# Patient Record
Sex: FEMALE
[De-identification: names, ages, dates, MRNs, and addresses within clinical notes are randomized per-mention and may not be internally consistent; named-entity substitution may affect disease eponyms.]

---

## 2018-07-24 ENCOUNTER — HOSPITAL ENCOUNTER (EMERGENCY)
Dept: HOSPITAL 5 - ED | Age: 63
LOS: 1 days | Discharge: HOME | End: 2018-07-25
Payer: MEDICARE

## 2018-07-24 ENCOUNTER — HOSPITAL ENCOUNTER (EMERGENCY)
Dept: HOSPITAL 5 - ED | Age: 63
End: 2018-07-24
Payer: MEDICARE

## 2018-07-24 VITALS — DIASTOLIC BLOOD PRESSURE: 64 MMHG | SYSTOLIC BLOOD PRESSURE: 129 MMHG

## 2018-07-24 VITALS — SYSTOLIC BLOOD PRESSURE: 147 MMHG | DIASTOLIC BLOOD PRESSURE: 58 MMHG

## 2018-07-24 DIAGNOSIS — R06.00: Primary | ICD-10-CM

## 2018-07-24 DIAGNOSIS — R06.02: ICD-10-CM

## 2018-07-24 DIAGNOSIS — Z88.0: ICD-10-CM

## 2018-07-24 DIAGNOSIS — Z91.013: ICD-10-CM

## 2018-07-24 DIAGNOSIS — R05: ICD-10-CM

## 2018-07-24 DIAGNOSIS — M19.90: ICD-10-CM

## 2018-07-24 DIAGNOSIS — I10: ICD-10-CM

## 2018-07-24 DIAGNOSIS — Z79.82: ICD-10-CM

## 2018-07-24 DIAGNOSIS — R07.89: Primary | ICD-10-CM

## 2018-07-24 DIAGNOSIS — Z90.711: ICD-10-CM

## 2018-07-24 DIAGNOSIS — J44.9: ICD-10-CM

## 2018-07-24 DIAGNOSIS — F17.200: ICD-10-CM

## 2018-07-24 LAB
APTT BLD: 26.2 SEC. (ref 24.2–36.6)
BASOPHILS # (AUTO): 0 K/MM3 (ref 0–0.1)
BASOPHILS NFR BLD AUTO: 0.3 % (ref 0–1.8)
BUN SERPL-MCNC: 15 MG/DL (ref 7–17)
BUN/CREAT SERPL: 14 %
CALCIUM SERPL-MCNC: 9.4 MG/DL (ref 8.4–10.2)
EOSINOPHIL # BLD AUTO: 0.4 K/MM3 (ref 0–0.4)
EOSINOPHIL NFR BLD AUTO: 7.8 % (ref 0–4.3)
HCT VFR BLD CALC: 29.7 % (ref 30.3–42.9)
HEMOLYSIS INDEX: 26
HGB BLD-MCNC: 9.9 GM/DL (ref 10.1–14.3)
INR PPP: 0.87 (ref 0.87–1.13)
LYMPHOCYTES # BLD AUTO: 1.8 K/MM3 (ref 1.2–5.4)
LYMPHOCYTES NFR BLD AUTO: 32.6 % (ref 13.4–35)
MCH RBC QN AUTO: 32 PG (ref 28–32)
MCHC RBC AUTO-ENTMCNC: 33 % (ref 30–34)
MCV RBC AUTO: 97 FL (ref 79–97)
MONOCYTES # (AUTO): 0.5 K/MM3 (ref 0–0.8)
MONOCYTES % (AUTO): 9.7 % (ref 0–7.3)
PLATELET # BLD: 246 K/MM3 (ref 140–440)
RBC # BLD AUTO: 3.06 M/MM3 (ref 3.65–5.03)

## 2018-07-24 PROCEDURE — 71275 CT ANGIOGRAPHY CHEST: CPT

## 2018-07-24 PROCEDURE — 93005 ELECTROCARDIOGRAM TRACING: CPT

## 2018-07-24 PROCEDURE — 94640 AIRWAY INHALATION TREATMENT: CPT

## 2018-07-24 PROCEDURE — 99284 EMERGENCY DEPT VISIT MOD MDM: CPT

## 2018-07-24 PROCEDURE — 85025 COMPLETE CBC W/AUTO DIFF WBC: CPT

## 2018-07-24 PROCEDURE — 85379 FIBRIN DEGRADATION QUANT: CPT

## 2018-07-24 PROCEDURE — 93010 ELECTROCARDIOGRAM REPORT: CPT

## 2018-07-24 PROCEDURE — 36415 COLL VENOUS BLD VENIPUNCTURE: CPT

## 2018-07-24 PROCEDURE — 71045 X-RAY EXAM CHEST 1 VIEW: CPT

## 2018-07-24 PROCEDURE — 85730 THROMBOPLASTIN TIME PARTIAL: CPT

## 2018-07-24 PROCEDURE — 83880 ASSAY OF NATRIURETIC PEPTIDE: CPT

## 2018-07-24 PROCEDURE — 96374 THER/PROPH/DIAG INJ IV PUSH: CPT

## 2018-07-24 PROCEDURE — 80048 BASIC METABOLIC PNL TOTAL CA: CPT

## 2018-07-24 PROCEDURE — 84484 ASSAY OF TROPONIN QUANT: CPT

## 2018-07-24 PROCEDURE — 85610 PROTHROMBIN TIME: CPT

## 2018-07-24 NOTE — CAT SCAN REPORT
FINAL REPORT



EXAM:  CT ANGIO CHEST



HISTORY:  dyspnea + d dimer 



TECHNIQUE:  CT chest CT angiogram with reconstructions 



PRIORS:  None.



FINDINGS:  

There is no evidence of filling defect within the central

pulmonary vasculature to suggest the presence of acute pulmonary

embolus. 



No evidence of mediastinal pathologic lymph node enlargement 



Heart and great vessels are unremarkable.  The aorta is normal in

caliber. 



Subpleural cystic changes are noted both lungs most prominent in

the upper lobes. No confluent pulmonary infiltrates identified. 



Visualized portion of the upper abdomen demonstrates no acute

change. 



IMPRESSION:  

Paraseptal pulmonary emphysematous changes 



No CT evidence for acute pulmonary embolus

## 2018-07-24 NOTE — EMERGENCY DEPARTMENT REPORT
Blank Doc





- Documentation


Documentation: 





Patient is 62 years old female with history of hypertension, COPD and 

arthritis.  Patient presented to the ER complaining of chest pain since 

yesterday described as substernal tightness in nature and does not radiate.  

Patient is also complaining of shortness of breath and cough.  Patient stated 

that she is not feeling well.  She denied any nausea or vomiting or fever 

recently.  Given patient past medical history and current presentation patient 

will be served best in our main ED.

## 2018-07-24 NOTE — XRAY REPORT
AP CHEST:



HISTORY: chest pain



AP view of the chest demonstrates a normal mediastinal and

cardiac contour with clear lungs and normal bony and soft tissue

structures.



IMPRESSION:

Unremarkable AP chest. No significant change since 6/28/18.

## 2018-07-24 NOTE — EMERGENCY DEPARTMENT REPORT
ED General Adult HPI





- General


Chief complaint: Chest Pain


Stated complaint: CANT BREATH/CHEST


Time Seen by Provider: 18 20:17


Source: patient, RN notes reviewed, old records reviewed


Mode of arrival: Ambulatory


Limitations: No Limitations





- History of Present Illness


Initial comments: 





This is a 62-year-old female who is unknown to this provider previously.  Has a 

past history of partial obstructive lung disease, reports noncompliance with 

home oxygen, cannot recall the name of her private pulmonologist, indicates 

that he/they are at [facility].





Patient was admitted to the hospital approximately one month ago for a COPD 

exacerbation, had a CT angiogram of the chest which was negative for pneumonia, 

pulmonary embolus.





Presents to the ER today with a complaint of chronic back pain and the 

shortness of breath.  It is intermittent, worsens with physical exertion and 

decreases with rest.  She denies chest pain to me.  She denies leg pain, leg 

swelling.  She indicates cough, and dry mucous production.  She also indicates 

that she is trying to detox from alcohol.  She is not homicidal or suicidal.


Patient complains of chronic neck and back pain which has been present for 5 

years, and indicates that she is seeing pain management for this.  She denies 

extremity weakness, numbness, unsteady gait.














-: Gradual


Location: back


Quality: aching


Consistency: intermittent


Improves with: rest


Worsens with: movement


Associated Symptoms: cough, loss of appetite, malaise, shortness of breath.  

denies: confusion, chest pain, diaphoresis, fever/chills, headaches, nausea/

vomiting, rash, seizure, syncope, weakness





- Related Data


 Home Medications











 Medication  Instructions  Recorded  Confirmed  Last Taken


 


ALBUTEROL Inhaler 2 inhalation INHALATION Q48HR PRN 18 Unknown


 


Amlodipine Besylate [Norvasc] 10 mg PO DAILY 18 Unknown


 


Citalopram Hydrobromide [celeXA] 40 mg PO DAILY 18 Unknown


 


HYDROcodone/ACETAMINOPHEN 7.5 mg PO Q6HR PRN 18 Unknown


 


Ipratropium (Nf) [Atrovent] 2 puff IH Q6HR PRN 18 Unknown


 


Mirtazapine [Remeron] 15 mg PO QHS 18 Unknown


 


levETIRAcetam [Keppra] 500 mg PO BID 18 Unknown








 Previous Rx's











 Medication  Instructions  Recorded  Last Taken  Type


 


Prednisone 50 mg PO DAILY #4 tablet 18 Unknown Rx


 


Albuterol Sulfate [Proair 90 mcg IH Q4HR PRN #2 aer.pow.ba 18 Unknown Rx





Respiclick]    


 


Aspirin [Aspirin BABY CHEW TAB] 81 mg PO QDAY #30 tab.chew 18 Unknown Rx


 


Ipratropium Bromide [Atrovent Hfa] 12.9 gm IH Q4HR #2 hfa.aer.ad 18 

Unknown Rx


 


predniSONE [Deltasone] 40 mg PO QDAY #8 tab 18 Unknown Rx











 Allergies











Allergy/AdvReac Type Severity Reaction Status Date / Time


 


Penicillins Allergy Unknown Unknown Verified 18 20:18


 


mycins Allergy  Unknown Uncoded 18 04:06














ED Review of Systems


ROS: 


Stated complaint: CANT BREATH/CHEST


Other details as noted in HPI





Constitutional: fever.  denies: malaise


Eyes: denies: eye discharge


Respiratory: cough, shortness of breath, wheezing


Cardiovascular: denies: chest pain


Gastrointestinal: denies: vomiting


Genitourinary: denies: dysuria


Musculoskeletal: arthralgia


Skin: denies: lesions


Neurological: weakness


Psychiatric: anxiety.  denies: suicidal thoughts





ED Past Medical Hx





- Past Medical History


Previous Medical History?: Yes


Hx Hypertension: Yes


Hx Seizures: Yes


Hx COPD: Yes





- Surgical History


Past Surgical History?: Yes


Additional Surgical History: Partial Hysterectomy





- Social History


Smoking Status: Current Every Day Smoker


Substance Use Type: Alcohol, Prescribed





- Medications


Home Medications: 


 Home Medications











 Medication  Instructions  Recorded  Confirmed  Last Taken  Type


 


ALBUTEROL Inhaler 2 inhalation INHALATION Q48HR PRN 18 Unknown 

History


 


Amlodipine Besylate [Norvasc] 10 mg PO DAILY 18 Unknown History


 


Citalopram Hydrobromide [celeXA] 40 mg PO DAILY 18 Unknown 

History


 


HYDROcodone/ACETAMINOPHEN 7.5 mg PO Q6HR PRN 18 Unknown History


 


Ipratropium (Nf) [Atrovent] 2 puff IH Q6HR PRN 18 Unknown History


 


Mirtazapine [Remeron] 15 mg PO QHS 18 Unknown History


 


levETIRAcetam [Keppra] 500 mg PO BID 18 Unknown History


 


Prednisone 50 mg PO DAILY #4 tablet 18 Unknown Rx


 


Albuterol Sulfate [Proair 90 mcg IH Q4HR PRN #2 aer.pow.ba 18  Unknown Rx





Respiclick]     


 


Aspirin [Aspirin BABY CHEW TAB] 81 mg PO QDAY #30 tab.chew 18  Unknown Rx


 


Ipratropium Bromide [Atrovent Hfa] 12.9 gm IH Q4HR #2 hfa.aer.ad 18  

Unknown Rx


 


predniSONE [Deltasone] 40 mg PO QDAY #8 tab 18  Unknown Rx














ED Physical Exam





- General


Limitations: No Limitations


General appearance: alert, in no apparent distress





- Head


Head exam: Present: atraumatic, normocephalic





- Eye


Eye exam: Present: normal appearance, EOMI.  Absent: nystagmus





- ENT


ENT exam: Present: normal orophraynx, other (patient has poor dentition)





- Neck


Neck exam: Present: normal inspection, full ROM.  Absent: tenderness, 

meningismus





- Respiratory


Respiratory exam: Present: rhonchi.  Absent: respiratory distress





- Cardiovascular


Cardiovascular Exam: Present: regular rate, normal rhythm, normal heart sounds.

  Absent: bradycardia, tachycardia, irregular rhythm, systolic murmur, 

diastolic murmur, rubs, gallop





- GI/Abdominal


GI/Abdominal exam: Present: soft, normal bowel sounds.  Absent: distended, 

tenderness, guarding, rebound, rigid, pulsatile mass





- Extremities Exam


Extremities exam: Present: normal inspection, full ROM.  Absent: pedal edema, 

joint swelling





- Back Exam


Back exam: Present: normal inspection, full ROM, paraspinal tenderness.  Absent

: vertebral tenderness





- Neurological Exam


Neurological exam: Present: alert, oriented X3, CN II-XII intact, normal gait, 

other (Extraocular movements intact.  Tongue midline.  No facial droop.  Facial 

sensation intact to light touch in the V1, V2, V3 distribution bilaterally.  5 

and 5 strength in 4 extremities..  Sensation is intact to light touch in 4 

extremities.).  Absent: motor sensory deficit





- Psychiatric


Psychiatric exam: Present: normal affect, normal mood





- Skin


Skin exam: Present: warm, dry, intact, normal color.  Absent: rash





ED Course


 Vital Signs











  18





  15:40 21:36 23:07


 


Temperature 98.8 F  


 


Pulse Rate 93 H  74


 


Pulse Rate [  80 





Right Bases]   


 


Respiratory 18  16





Rate   


 


Respiratory  18 





Rate [Right   





Bases]   


 


Blood Pressure 126/69  


 


Blood Pressure   129/64





[Left]   


 


O2 Sat by Pulse 99  93





Oximetry   














- Reevaluation(s)


Reevaluation #1: 





18 20:48





Differential diagnosis, including but not limited to: COPD, pneumonia, asthma, 

reactive airway disease, pulmonary embolus, acute coronary syndrome,











Assessment and plan: 62-year-old female who makes no complaint of chest pain to 

this provider even though I asked her multiple times if she was having any pain 

other than her back pain and neck pain.  She's had 3 negative troponins within 

the past 24 hours, her EKG is unremarkable and unchanged 2, patient low risk 

by JAKUB score, low risk by heart score.  Had a negative CT angiogram of the 

chest within the past month.


A d-dimer was sent prior to my evaluation, based on my personal assessment I 

find the patient to be low risk by well's criteria, and this d-dimer is most 

likely a false positive.  However, given her recent hospitalization, we will 

obtain a CT scan of the chest to exclude pulmonary embolus.


I explained to the patient that she would need to follow-up with her outpatient 

pulmonologist.  She is clinically sober at this time, with a GCS of 15, with an 

NIH score of 0.  She does not meet 1013 criteria at this time.  Most likely, 

the patient is experiencing the natural progression and natural history of her 

underlying lung disease.

















Reevaluation #2: 





18 20:49


I will place a case management consult assist the patient with outpatient 

management. 


Reevaluation #3: 





18 20:51


Laboratory studies from earlier on today indicate troponin negative 3, EKG 

unchanged 2 and from prior.  X-ray of the chest is unremarkable.


Reevaluation #4: 





18 23:48


Rhonchorous breath sounds have improved.  The patient is sleeping comfortably 

on her stretcher.  Her CT scan shows no pneumonia, pneumothorax or pulmonary 

embolus.  Patient is sleeping and is in no acute distress at this time.  

Objectively speaking she is medically suitable for discharge at this time.





ED Medical Decision Making





- Lab Data








 Vital Signs - 24 hr











  18





  15:40


 


Temperature 98.8 F


 


Pulse Rate 93 H


 


Respiratory 18





Rate 


 


Blood Pressure 126/69


 


O2 Sat by Pulse 99





Oximetry 














- EKG Data


-: EKG Interpreted by Me


EKG shows normal: sinus rhythm, axis, intervals, QRS complexes, ST-T waves





- EKG Data


When compared to previous EKG there are: no significant change


Interpretation: no acute changes, normal EKG





- Radiology Data


Radiology results: report reviewed, image reviewed


interpreted by me: 





X-ray of the chest performed earlier on today is negative for acute disease





Print Report


Referring Physician:   DAVONTE SANDY


Patient Name:   EDINSON CERON


Patient ID:   [id number]


YOB: 1955


Sex:   Female


Accession:   [id number]


Report Date:   2018


Report Status:   Finalized


Findings


[place] [address] 





Cat Scan Report 


Signed 





Patient: EDINSON CERON MR#: [id number] 


: 1955 Acct:[id number] 


Age/Sex: 62 / F ADM Date: 18 


Loc: ED 


Attending Dr: 








Ordering Physician: DAVONTE SANDY MD 


Date of Service: 18 


Procedure(s): CT angio chest 


Accession Number(s): [id number] 





cc: DAVONTE SANDY MD 








FINAL REPORT 





PROCEDURE: CT ANGIO CHEST 





TECHNIQUE: Computerized tomographic angiography of the chest was 


performed after the IV injection of iodinated nonionic contrast 


including image processing. The image data was postprocessed 


using 2-dimensional multiplanar reformatted (MPR) and 


3-dimensional (MIP and/or volume rendered) techniques. 





HISTORY: Elevated D-Dimer 





COMPARISON: No prior studies are available for comparison. 





FINDINGS: 


Bilateral pulmonary arteries and their branches demonstrate 


normal opacification without filling defects. Aorta is of normal 


caliber without evidence of dissection or aneurysm formation. 


Cardiac size is within normal limits. There is mild degree 


sub-carinal lymphadenopathy. Nonenlarged right hilar lymph nodes 


are identified. Thyroid demonstrates normal size and density. 


Visualized upper abdominal structures are within normal limits. 


There is diffuse thickening of interstitial septa. There are no 


confluent infiltrates or mass lesions. 





IMPRESSION: 


No evidence of pulmonary embolism 





No acute pulmonary process 





Diffuse interstitial septal thickening most likely represents 


interstitial fibrosis. 





Mild degree sub-carinal lymphadenopathy. 











Transcribed By: [facility] 


Dictated By: SUPRIYA OSORIO 


Electronically Authenticated By: SUPRIYA OSORIO 


Signed Date/Time: 18 8914 





Critical care attestation.: 


If time is entered above; I have spent that time in minutes in the direct care 

of this critically ill patient, excluding procedure time.








ED Disposition


Clinical Impression: 


Dyspnea


Qualifiers:


 Dyspnea type: unspecified Qualified Code(s): R06.00 - Dyspnea, unspecified





Disposition: - TO HOME OR SELFCARE


Is pt being admited?: No


Does the pt Need Aspirin: No


Condition: Good


Instructions:  Chronic Obstructive Pulmonary Disease (ED)


Additional Instructions: 


Continue outpatient medications as directed.  follow up with her primary care 

doctor or cardiologist within the next 3-5 days.  Follow-up with a pulmonary 

specialist within the next 2-3 weeks.  Make certain to abstain or limit alcohol 

consumption.  Return to the ER right away with new pain, worsened pain, 

migration of pain, fevers, chills, lethargy, irritability, projectile vomiting, 

change in mental status, confusion, inability to tolerate liquid feeds.


Prescriptions: 


Albuterol Sulfate [Proair Respiclick] 90 mcg IH Q4HR PRN #2 aer.pow.ba


 PRN Reason: Wheezing


Aspirin [Aspirin BABY CHEW TAB] 81 mg PO QDAY #30 tab.chew


Ipratropium Bromide [Atrovent Hfa] 12.9 gm IH Q4HR #2 hfa.aer.ad


predniSONE [Deltasone] 40 mg PO QDAY #8 tab


Referrals: 


PRIMARY CARE,MD [Primary Care Provider] - 3-5 Days


LIBAN HUANG MD [Staff Physician] - 3-5 Days


[facility] [Provider Group] - 3-5 Days


[facility] [Provider Group] - 3-5 Days


[facility] [Provider Group] - 3-5 Days

## 2018-07-25 ENCOUNTER — HOSPITAL ENCOUNTER (EMERGENCY)
Dept: HOSPITAL 5 - ED | Age: 63
Discharge: LEFT BEFORE DISCHARGE | End: 2018-07-25
Payer: MEDICARE

## 2018-07-25 VITALS — DIASTOLIC BLOOD PRESSURE: 87 MMHG | SYSTOLIC BLOOD PRESSURE: 149 MMHG

## 2018-07-25 DIAGNOSIS — Z53.21: ICD-10-CM

## 2018-07-25 DIAGNOSIS — Z02.89: Primary | ICD-10-CM

## 2018-07-25 LAB
BENZODIAZEPINES SCREEN,URINE: (no result)
BILIRUB UR QL STRIP: (no result)
BLOOD UR QL VISUAL: (no result)
METHADONE SCREEN,URINE: (no result)
MUCOUS THREADS #/AREA URNS HPF: (no result) /HPF
OPIATE SCREEN,URINE: (no result)
PH UR STRIP: 5 [PH] (ref 5–7)
PROT UR STRIP-MCNC: (no result) MG/DL
RBC #/AREA URNS HPF: 1 /HPF (ref 0–6)
UROBILINOGEN UR-MCNC: < 2 MG/DL (ref ?–2)
WBC #/AREA URNS HPF: 1 /HPF (ref 0–6)

## 2018-07-25 PROCEDURE — 80307 DRUG TEST PRSMV CHEM ANLYZR: CPT

## 2018-07-25 PROCEDURE — 81001 URINALYSIS AUTO W/SCOPE: CPT

## 2018-07-29 ENCOUNTER — HOSPITAL ENCOUNTER (EMERGENCY)
Dept: HOSPITAL 5 - ED | Age: 63
Discharge: HOME | End: 2018-07-29
Payer: MEDICARE

## 2018-07-29 VITALS — SYSTOLIC BLOOD PRESSURE: 112 MMHG | DIASTOLIC BLOOD PRESSURE: 77 MMHG

## 2018-07-29 DIAGNOSIS — J44.9: Primary | ICD-10-CM

## 2018-07-29 DIAGNOSIS — Z90.711: ICD-10-CM

## 2018-07-29 DIAGNOSIS — Z59.0: ICD-10-CM

## 2018-07-29 DIAGNOSIS — F17.200: ICD-10-CM

## 2018-07-29 DIAGNOSIS — Z88.0: ICD-10-CM

## 2018-07-29 DIAGNOSIS — I10: ICD-10-CM

## 2018-07-29 DIAGNOSIS — Z76.0: ICD-10-CM

## 2018-07-29 PROCEDURE — 99282 EMERGENCY DEPT VISIT SF MDM: CPT

## 2018-07-29 SDOH — ECONOMIC STABILITY - HOUSING INSECURITY: HOMELESSNESS: Z59.0

## 2018-07-29 NOTE — EMERGENCY DEPARTMENT REPORT
ED Medical Clearance HPI





- General


Chief complaint: Medical Clearance


Stated complaint: MEDICATION REFILL


Time Seen by Provider: 07/29/18 21:08


Source: patient


Mode of arrival: Ambulatory





- History of Present Illness


Initial comments: 





62-year-old  female comes in wanting to be admitted since she does not 

have any medications.  Patient reports that she has her prescriptions but does 

not the money to fill them.  Patient reports that she has been off of her 

Keppra for the last 3 days and does not have her respiratory medications.  

Patient reports that she went to [facility] to check in for alcohol abuse but 

they're not able to keep her until Monday.  So patient comes to the emergency 

room wanting us to admit her until she is able to be seen at [facility].  

Patient does admit that she has her prescriptions and does not need them to be 

rewritten.  Patient denies any homicidal or suicidal ideations.  She did elicit 

that she was at [facility] 2 weeks ago and was intubated but does not know 

why.  Patient reported to triage that she could not breathe so she needs to be 

admitted until tomorrow. 


Home medications: 


 Home Medications











 Medication  Instructions  Recorded  Confirmed  Last Taken


 


ALBUTEROL Inhaler 2 inhalation INHALATION Q48HR PRN 06/21/18 07/24/18 Unknown


 


Amlodipine Besylate [Norvasc] 10 mg PO DAILY 06/21/18 07/24/18 Unknown


 


Citalopram Hydrobromide [celeXA] 40 mg PO DAILY 06/21/18 07/24/18 Unknown


 


HYDROcodone/ACETAMINOPHEN 7.5 mg PO Q6HR PRN 06/21/18 07/24/18 Unknown


 


Ipratropium (Nf) [Atrovent] 2 puff IH Q6HR PRN 06/21/18 07/24/18 Unknown


 


Mirtazapine [Remeron] 15 mg PO QHS 06/21/18 07/24/18 Unknown


 


levETIRAcetam [Keppra] 500 mg PO BID 06/21/18 07/24/18 Unknown








 Previous Rx's











 Medication  Instructions  Recorded  Last Taken  Type


 


Prednisone 50 mg PO DAILY #4 tablet 06/29/18 Unknown Rx


 


Albuterol Sulfate [Proair 90 mcg IH Q4HR PRN #2 aer.pow.ba 07/24/18 Unknown Rx





Respiclick]    


 


Aspirin [Aspirin BABY CHEW TAB] 81 mg PO QDAY #30 tab.chew 07/24/18 Unknown Rx


 


Ipratropium Bromide [Atrovent Hfa] 12.9 gm IH Q4HR #2 hfa.aer.ad 07/24/18 

Unknown Rx


 


predniSONE [Deltasone] 40 mg PO QDAY #8 tab 07/24/18 Unknown Rx











Allergies/Adverse reactions: 


 Allergies











Allergy/AdvReac Type Severity Reaction Status Date / Time


 


Penicillins Allergy Unknown Unknown Verified 06/28/18 20:18


 


mycins Allergy  Unknown Uncoded 07/24/18 04:06














ED Review of Systems


ROS: 


Stated complaint: MEDICATION REFILL


Other details as noted in HPI





Constitutional: denies: chills, fever


Eyes: denies: eye pain, eye discharge, vision change


ENT: denies: ear pain, throat pain


Respiratory: shortness of breath


Cardiovascular: denies: chest pain, palpitations


Endocrine: no symptoms reported


Gastrointestinal: denies: abdominal pain, nausea, diarrhea


Genitourinary: denies: urgency, dysuria, discharge


Musculoskeletal: denies: back pain, joint swelling, arthralgia


Skin: denies: rash, lesions


Neurological: denies: headache, weakness, paresthesias


Psychiatric: denies: anxiety, depression





ED Past Medical Hx





- Past Medical History


Previous Medical History?: Yes


Hx Hypertension: Yes


Hx Seizures: Yes


Hx COPD: Yes





- Surgical History


Past Surgical History?: Yes


Additional Surgical History: Partial Hysterectomy





- Social History


Smoking Status: Current Every Day Smoker


Substance Use Type: Alcohol





- Medications


Home Medications: 


 Home Medications











 Medication  Instructions  Recorded  Confirmed  Last Taken  Type


 


ALBUTEROL Inhaler 2 inhalation INHALATION Q48HR PRN 06/21/18 07/24/18 Unknown 

History


 


Amlodipine Besylate [Norvasc] 10 mg PO DAILY 06/21/18 07/24/18 Unknown History


 


Citalopram Hydrobromide [celeXA] 40 mg PO DAILY 06/21/18 07/24/18 Unknown 

History


 


HYDROcodone/ACETAMINOPHEN 7.5 mg PO Q6HR PRN 06/21/18 07/24/18 Unknown History


 


Ipratropium (Nf) [Atrovent] 2 puff IH Q6HR PRN 06/21/18 07/24/18 Unknown History


 


Mirtazapine [Remeron] 15 mg PO QHS 06/21/18 07/24/18 Unknown History


 


levETIRAcetam [Keppra] 500 mg PO BID 06/21/18 07/24/18 Unknown History


 


Prednisone 50 mg PO DAILY #4 tablet 06/29/18 07/24/18 Unknown Rx


 


Albuterol Sulfate [Proair 90 mcg IH Q4HR PRN #2 aer.pow.ba 07/24/18  Unknown Rx





Respiclick]     


 


Aspirin [Aspirin BABY CHEW TAB] 81 mg PO QDAY #30 tab.chew 07/24/18  Unknown Rx


 


Ipratropium Bromide [Atrovent Hfa] 12.9 gm IH Q4HR #2 hfa.aer.ad 07/24/18  

Unknown Rx


 


predniSONE [Deltasone] 40 mg PO QDAY #8 tab 07/24/18  Unknown Rx














ED Physical Exam





- General


Limitations: No Limitations


General appearance: alert, in no apparent distress





- Head


Head exam: Present: atraumatic, normocephalic





- Eye


Eye exam: Present: EOMI





- ENT


ENT exam: Present: mucous membranes moist





- Respiratory


Respiratory exam: Present: normal lung sounds bilaterally.  Absent: respiratory 

distress, wheezes, rales, rhonchi, stridor





- Cardiovascular


Cardiovascular Exam: Present: regular rate, normal rhythm.  Absent: systolic 

murmur, diastolic murmur, rubs, gallop





- GI/Abdominal


GI/Abdominal exam: Present: soft, normal bowel sounds.  Absent: distended, 

tenderness





- Extremities Exam


Extremities exam: Present: normal inspection, full ROM





- Neurological Exam


Neurological exam: Present: alert, oriented X3, normal gait





- Psychiatric


Psychiatric exam: Present: anxious.  Absent: homicidal ideation, suicidal 

ideation





- Skin


Skin exam: Present: warm, dry, intact, normal color.  Absent: rash





ED Course


 Vital Signs











  07/29/18





  17:29


 


Temperature 98.5 F


 


Pulse Rate 104 H


 


Respiratory 16





Rate 


 


Blood Pressure 112/77


 


O2 Sat by Pulse 97





Oximetry 














ED Medical Decision Making





- Medical Decision Making





Patient's been evaluated by this provider fast track.


I will give patient Keppra 500 mg by mouth now for her seizure disorder.


Discussed the patient she needs filled her prescriptions for her chronic 

medication.


Discussed the patient she needs to follow-up with Isaías for her therapy for 

alcohol abuse.


Patient verbalizes understanding





ED Disposition


Clinical Impression: 


 Medication refill, Homeless





Disposition: DC-01 TO HOME OR SELFCARE


Is pt being admited?: No


Does the pt Need Aspirin: No


Condition: Stable


Additional Instructions: 


Please fill your prescriptions that she would have.  Please follow-up with 

Isaías PETERS your primary care provider.


Referrals: 


PRIMARY CARE,MD [Primary Care Provider] - 3-5 Days